# Patient Record
Sex: FEMALE | URBAN - METROPOLITAN AREA
[De-identification: names, ages, dates, MRNs, and addresses within clinical notes are randomized per-mention and may not be internally consistent; named-entity substitution may affect disease eponyms.]

---

## 2018-11-20 ENCOUNTER — APPOINTMENT (RX ONLY)
Dept: URBAN - METROPOLITAN AREA CLINIC 59 | Facility: CLINIC | Age: 63
Setting detail: DERMATOLOGY
End: 2018-11-20

## 2018-11-20 DIAGNOSIS — Z41.9 ENCOUNTER FOR PROCEDURE FOR PURPOSES OTHER THAN REMEDYING HEALTH STATE, UNSPECIFIED: ICD-10-CM

## 2018-11-20 PROCEDURE — ? LUMENIS M22

## 2018-11-20 NOTE — PROCEDURE: LUMENIS M22
External Cooling Fan Speed: 0
Skin Type (Optional): II
Detail Level: Detailed
Post-Care Instructions: I reviewed with the patient in detail post-care instructions. Patient should stay away from the sun and wear sun protection until treated areas are fully healed.
Fluence Units: J/cm2
Pulse Duration (In Milliseconds): 3
Total Pulses: 1
Procedure Text: Reviewed risks and benefits of treatment and consent form signed. \\nThe patient's skin was cleaned and decreased with witch hazel. \\nGel applied in thick layer to treatment area. \\n590, 19j, double pulse x 1 pass. \\n560, 18j, double pulse x 1 pass.\\n515, 15j, single pulse on brown spots only\\n515, 17-19j, double to single pulse to vessels at nares\\nPatient tolerated treatment well. Excellent clinical response noted. Clinicalm and SPF applied. Post instructions reviewed with patient. \\nFollow up \\n#1/3
Consent: Written consent obtained, risks reviewed including but not limited to crusting, scabbing, blistering, scarring, darker or lighter pigmentary change, bruising, and/or incomplete response.
Treated Area: large area

## 2018-12-19 ENCOUNTER — APPOINTMENT (RX ONLY)
Dept: URBAN - METROPOLITAN AREA CLINIC 59 | Facility: CLINIC | Age: 63
Setting detail: DERMATOLOGY
End: 2018-12-19

## 2018-12-19 DIAGNOSIS — Z41.9 ENCOUNTER FOR PROCEDURE FOR PURPOSES OTHER THAN REMEDYING HEALTH STATE, UNSPECIFIED: ICD-10-CM

## 2018-12-19 PROCEDURE — ? JUVEDERM VOLUMA XC INJECTION

## 2018-12-19 PROCEDURE — ? BOTOX

## 2018-12-19 PROCEDURE — ? ADDITIONAL NOTES

## 2018-12-19 PROCEDURE — ? LASER COSMETIC

## 2018-12-19 ASSESSMENT — LOCATION DETAILED DESCRIPTION DERM
LOCATION DETAILED: RIGHT CENTRAL EYEBROW
LOCATION DETAILED: RIGHT MEDIAL EYEBROW
LOCATION DETAILED: GLABELLA
LOCATION DETAILED: LEFT MEDIAL EYEBROW
LOCATION DETAILED: LEFT LATERAL EYEBROW
LOCATION DETAILED: RIGHT LATERAL EYEBROW

## 2018-12-19 ASSESSMENT — LOCATION SIMPLE DESCRIPTION DERM
LOCATION SIMPLE: LEFT EYEBROW
LOCATION SIMPLE: GLABELLA
LOCATION SIMPLE: RIGHT EYEBROW

## 2018-12-19 ASSESSMENT — LOCATION ZONE DERM: LOCATION ZONE: FACE

## 2018-12-19 NOTE — PROCEDURE: LASER COSMETIC
End-Point And Post-Procedure Care: Reviewed the risks and benefits of treatment and consent form signed. \\nSkin cleansed and gel applied. \\n515, 17-24j, double to single pulse small crystal. \\nExcellent clinical response. Patient tolerated well. Calming balm & SPF applied. Follow up 4 weeks
Render Post-Care In The Note: No
Laser Type: IPL
Detail Level: Zone
Anesthesia Volume In Cc: 0
Consent: Prior to the procedure, risks and benefits reviewed including but not limited to crusting, scabbing, blistering, scarring, darker or lighter pigmentary change, incidental hair removal, bruising, and/or incomplete removal.
Pre-Procedure Care: Prior to the procedure the patient and all present had protective eyewear in place and a warning sign was placed on the door.
Eye Protection: Laser Aid
Post-Care Instructions: I reviewed with the patient in detail post-care instructions. Patient should stay away from the sun and wear sun protection until treated areas are fully healed.

## 2018-12-19 NOTE — PROCEDURE: ADDITIONAL NOTES
Additional Notes: Voluma 2 syringes\\n27g1/2” needle aliquot temples \\nRight.5\\nLeft.5\\nZygomatic arch \\nV1\\nRight.1\\nLeft.1\\nV2\\nRight.2\\nLeft.2\\nApex\\nRight.2\\nLeft.2
Detail Level: Simple

## 2018-12-19 NOTE — PROCEDURE: JUVEDERM VOLUMA XC INJECTION
Anesthesia Type: 1% lidocaine with epinephrine
Vermilion Lips Filler Volume In Cc: 0
Expiration Date (Month Year): 2020/02/03
Additional Area 1 Location: right side
Filler: Juvederm Voluma XC
Additional Area 2 Location: left side
Map Statment: See Attach Map for Complete Details
Topical Anesthesia?: BLT cream (benzocaine 20%, lidocaine 6%, tetracaine 4%)
Anesthesia Volume In Cc: 0.5
Procedural Text: The filler was administered to the treatment areas noted above.\\n.3 cc to the temple\\n.2 cc per anterior cheek V1
Consent: Written consent obtained. Risks include but not limited to bruising, beading, irregular texture, ulceration, infection, allergic reaction, scar formation, incomplete augmentation, temporary nature, procedural pain.
Lot #: [id number]
Post-Care Instructions: Patient instructed to apply ice to reduce swelling.
Include Cannula Information In Note?: No
Use Map Statement For Sites (Optional): Yes
Detail Level: Detailed

## 2018-12-19 NOTE — PROCEDURE: BOTOX
Levator Labii Superioris Units: 0
Consent: Written consent obtained. Risks include but not limited to lid/brow ptosis, bruising, swelling, diplopia, temporary effect, incomplete chemical denervation.
Additional Area 3 Location: jelly roll
Lot #: [id number]
Post-Care Instructions: Patient instructed to not lie down for 4 hours and limit physical activity for 24 hours. Patient instructed not to travel by airplane for 48 hours.
Additional Area 1 Location: [facility]
Additional Area 2 Location: tail brows bilateral
Forehead Units: 6
Additional Area 5 Location: [facility]
Additional Area 6 Location: lip
Dilution (U/0.1 Cc): 2
Expiration Date (Month Year): 06/2021
Additional Area 4 Location: right tail brow
Glabellar Complex Units: 22
Detail Level: Zone

## 2019-01-07 ENCOUNTER — APPOINTMENT (RX ONLY)
Dept: URBAN - METROPOLITAN AREA CLINIC 59 | Facility: CLINIC | Age: 64
Setting detail: DERMATOLOGY
End: 2019-01-07

## 2019-01-07 DIAGNOSIS — Z41.9 ENCOUNTER FOR PROCEDURE FOR PURPOSES OTHER THAN REMEDYING HEALTH STATE, UNSPECIFIED: ICD-10-CM

## 2019-01-07 PROCEDURE — ? COSMETIC FOLLOW-UP

## 2019-01-07 NOTE — PROCEDURE: COSMETIC FOLLOW-UP
Comments (Free Text): Pt wasn’t sure she noticed any difference but after we did side by side pic she noticed a big difference and is happy with her filler
Detail Level: Zone

## 2019-01-15 ENCOUNTER — APPOINTMENT (RX ONLY)
Dept: URBAN - METROPOLITAN AREA CLINIC 59 | Facility: CLINIC | Age: 64
Setting detail: DERMATOLOGY
End: 2019-01-15

## 2019-01-15 DIAGNOSIS — Z41.9 ENCOUNTER FOR PROCEDURE FOR PURPOSES OTHER THAN REMEDYING HEALTH STATE, UNSPECIFIED: ICD-10-CM

## 2019-01-15 PROCEDURE — ? LUMENIS M22

## 2019-01-15 NOTE — PROCEDURE: LUMENIS M22
External Cooling Fan Speed: 0
Treatment Number: 3
Treated Area: large area
Consent: Written consent obtained, risks reviewed including but not limited to crusting, scabbing, blistering, scarring, darker or lighter pigmentary change, bruising, and/or incomplete response.
Fluence Units: J/cm2
Detail Level: Detailed
Total Pulses: 1
Procedure Text: Reviewed risks and benefits of treatment and consent form signed. \\nThe patient's skin was cleaned and decreased with witch hazel. \\nGel applied in thick layer to treatment area. \\n590, 19j, double pulse x 1 pass. \\n560, 18j, double pulse x 1 pass.\\n515, 15j, single pulse on brown spots only\\n515, 17-19j, double to single pulse to vessels at nares\\nPatient tolerated treatment well. Excellent clinical response noted. Clinicalm and SPF applied. Post instructions reviewed with patient. \\nFollow up \\n#1/3
Skin Type (Optional): II
Post-Care Instructions: I reviewed with the patient in detail post-care instructions. Patient should stay away from the sun and wear sun protection until treated areas are fully healed.

## 2019-10-22 ENCOUNTER — APPOINTMENT (RX ONLY)
Dept: URBAN - METROPOLITAN AREA CLINIC 59 | Facility: CLINIC | Age: 64
Setting detail: DERMATOLOGY
End: 2019-10-22

## 2019-10-22 DIAGNOSIS — Z41.9 ENCOUNTER FOR PROCEDURE FOR PURPOSES OTHER THAN REMEDYING HEALTH STATE, UNSPECIFIED: ICD-10-CM

## 2019-10-22 PROCEDURE — ? LUMENIS M22

## 2019-10-22 NOTE — PROCEDURE: LUMENIS M22
Pulse Duration (In Milliseconds): 3
Detail Level: Detailed
Pulse Delay (In Milliseconds): 0
Number Of Passes: 1
Procedure Text: Reviewed risks and benefits of treatment and consent form signed. \\nThe patient's skin was cleaned and decreased with witch hazel. \\nGel applied in thick layer to treatment area. \\n590, 19j, double pulse x 1 pass. \\n560, 18j, double pulse x 1 pass.\\n515, 15j, single pulse on brown spots only\\n515, 17-19j, double to single pulse to vessels at nares\\nPatient tolerated treatment well. Excellent clinical response noted. Clinicalm and SPF applied. Post instructions reviewed with patient. \\nFollow up \\n#1/3
Treated Area: large area
Fluence Units: J/cm2
Post-Care Instructions: I reviewed with the patient in detail post-care instructions. Patient should stay away from the sun and wear sun protection until treated areas are fully healed.
Consent: Written consent obtained, risks reviewed including but not limited to crusting, scabbing, blistering, scarring, darker or lighter pigmentary change, bruising, and/or incomplete response.
Skin Type (Optional): II

## 2019-11-19 ENCOUNTER — APPOINTMENT (RX ONLY)
Dept: URBAN - METROPOLITAN AREA CLINIC 59 | Facility: CLINIC | Age: 64
Setting detail: DERMATOLOGY
End: 2019-11-19

## 2019-11-19 DIAGNOSIS — Z41.9 ENCOUNTER FOR PROCEDURE FOR PURPOSES OTHER THAN REMEDYING HEALTH STATE, UNSPECIFIED: ICD-10-CM

## 2019-11-19 PROCEDURE — ? LUMENIS M22

## 2019-11-19 NOTE — PROCEDURE: LUMENIS M22
Detail Level: Detailed
Procedure Text: Reviewed risks and benefits of treatment and consent form signed. \\nThe patient's skin was cleaned and decreased with witch hazel. \\nGel applied in thick layer to treatment area. \\n590, 19j, double pulse x 1 pass. \\n560, 18j, double pulse x 1 pass.\\n515, 15j, single pulse on brown spots only\\n515, 17-19j, double to single pulse to vessels at nares\\nPatient tolerated treatment well. Excellent clinical response noted. Clinicalm and SPF applied. Post instructions reviewed with patient. \\nFollow up \\n#1/3
Pulse Delay (In Milliseconds): 0
Post-Care Instructions: I reviewed with the patient in detail post-care instructions. Patient should stay away from the sun and wear sun protection until treated areas are fully healed.
Consent: Written consent obtained, risks reviewed including but not limited to crusting, scabbing, blistering, scarring, darker or lighter pigmentary change, bruising, and/or incomplete response.
Pulse Duration (In Milliseconds): 3
Fluence Units: J/cm2
Treatment Number: 2
Total Pulses: 1
Treated Area: large area
Skin Type (Optional): II

## 2019-12-17 ENCOUNTER — APPOINTMENT (RX ONLY)
Dept: URBAN - METROPOLITAN AREA CLINIC 59 | Facility: CLINIC | Age: 64
Setting detail: DERMATOLOGY
End: 2019-12-17

## 2019-12-17 DIAGNOSIS — Z41.9 ENCOUNTER FOR PROCEDURE FOR PURPOSES OTHER THAN REMEDYING HEALTH STATE, UNSPECIFIED: ICD-10-CM

## 2019-12-17 PROCEDURE — ? LUMENIS M22

## 2019-12-17 NOTE — PROCEDURE: LUMENIS M22
Procedure Text: Reviewed risks and benefits of treatment and consent form signed. \\nThe patient's skin was cleaned and decreased with witch hazel. \\nGel applied in thick layer to treatment area. \\n590, 19j, double pulse x 1 pass. \\n560, 18j, double pulse x 1 pass.\\n515, 15j, single pulse on brown spots only\\n515, 17-19j, double to single pulse to vessels at nares\\nPatient tolerated treatment well. Excellent clinical response noted. Clinicalm and SPF applied. Post instructions reviewed with patient. \\nFollow up \\n#1/3
External Cooling Fan Speed: 0
Number Of Passes: 1
Fluence Units: J/cm2
Treatment Number: 3
Consent: Written consent obtained, risks reviewed including but not limited to crusting, scabbing, blistering, scarring, darker or lighter pigmentary change, bruising, and/or incomplete response.
Post-Care Instructions: I reviewed with the patient in detail post-care instructions. Patient should stay away from the sun and wear sun protection until treated areas are fully healed.
Skin Type (Optional): II
Detail Level: Detailed
Treated Area: large area

## 2020-01-15 ENCOUNTER — APPOINTMENT (RX ONLY)
Dept: URBAN - METROPOLITAN AREA CLINIC 59 | Facility: CLINIC | Age: 65
Setting detail: DERMATOLOGY
End: 2020-01-15

## 2020-01-15 DIAGNOSIS — Z41.9 ENCOUNTER FOR PROCEDURE FOR PURPOSES OTHER THAN REMEDYING HEALTH STATE, UNSPECIFIED: ICD-10-CM

## 2020-01-15 PROCEDURE — ? LUMENIS M22

## 2020-01-15 NOTE — PROCEDURE: LUMENIS M22
Post-Care Instructions: I reviewed with the patient in detail post-care instructions. Patient should stay away from the sun and wear sun protection until treated areas are fully healed.
External Cooling Fan Speed: 0
Number Of Passes: 1
Pulse Duration (In Milliseconds): 3
Treatment Number: 4
Skin Type (Optional): II
Consent: Written consent obtained, risks reviewed including but not limited to crusting, scabbing, blistering, scarring, darker or lighter pigmentary change, bruising, and/or incomplete response.
Treated Area: large area
Procedure Text: Reviewed risks and benefits of treatment and consent form signed. \\nThe patient's skin was cleaned and decreased with witch hazel. \\nGel applied in thick layer to treatment area. \\n590, 19j, double pulse x 1 pass. \\n560, 18j, double pulse x 1 pass.\\n515, 15j, single pulse on brown spots only\\n515, 17-19j, double to single pulse to vessels at nares\\nPatient tolerated treatment well. Excellent clinical response noted. Clinicalm and SPF applied. Post instructions reviewed with patient. \\nFollow up \\n#1/3
Fluence Units: J/cm2
Detail Level: Detailed

## 2020-02-12 ENCOUNTER — APPOINTMENT (RX ONLY)
Dept: URBAN - METROPOLITAN AREA CLINIC 59 | Facility: CLINIC | Age: 65
Setting detail: DERMATOLOGY
End: 2020-02-12

## 2020-02-12 DIAGNOSIS — Z41.9 ENCOUNTER FOR PROCEDURE FOR PURPOSES OTHER THAN REMEDYING HEALTH STATE, UNSPECIFIED: ICD-10-CM

## 2020-02-12 PROCEDURE — ? LUMENIS M22

## 2020-02-12 NOTE — PROCEDURE: LUMENIS M22
Fluence Units: J/cm2
Total Pulses: 1
Treated Area: large area
Consent: Written consent obtained, risks reviewed including but not limited to crusting, scabbing, blistering, scarring, darker or lighter pigmentary change, bruising, and/or incomplete response.
Pulse Duration (In Milliseconds): 3
Skin Type (Optional): II
Treatment Number: 5
Detail Level: Detailed
Pulse Delay (In Milliseconds): 0
Procedure Text: Reviewed risks and benefits of treatment and consent form signed. \\nThe patient's skin was cleaned and decreased with witch hazel. \\nGel applied in thick layer to treatment area. \\n590, 19j, double pulse x 1 pass. \\n560, 18j, double pulse x 1 pass.\\n515, 15j, single pulse on brown spots only\\n515, 17-19j, double to single pulse to vessels at nares\\nPatient tolerated treatment well. Excellent clinical response noted. Clinicalm and SPF applied. Post instructions reviewed with patient. \\nFollow up \\n#1/3
Post-Care Instructions: I reviewed with the patient in detail post-care instructions. Patient should stay away from the sun and wear sun protection until treated areas are fully healed.

## 2022-10-26 ENCOUNTER — APPOINTMENT (RX ONLY)
Dept: URBAN - METROPOLITAN AREA CLINIC 59 | Facility: CLINIC | Age: 67
Setting detail: DERMATOLOGY
End: 2022-10-26

## 2022-10-26 DIAGNOSIS — Z41.9 ENCOUNTER FOR PROCEDURE FOR PURPOSES OTHER THAN REMEDYING HEALTH STATE, UNSPECIFIED: ICD-10-CM

## 2022-10-26 PROCEDURE — ? BOTOX

## 2022-10-26 ASSESSMENT — LOCATION DETAILED DESCRIPTION DERM
LOCATION DETAILED: LEFT FOREHEAD
LOCATION DETAILED: RIGHT LATERAL FOREHEAD
LOCATION DETAILED: LEFT LATERAL FOREHEAD
LOCATION DETAILED: LEFT SUPERIOR FOREHEAD
LOCATION DETAILED: LEFT SUPERIOR CENTRAL MALAR CHEEK
LOCATION DETAILED: RIGHT LATERAL EYEBROW
LOCATION DETAILED: RIGHT SUPERIOR FOREHEAD
LOCATION DETAILED: LEFT INFERIOR TEMPLE
LOCATION DETAILED: SUPERIOR MID FOREHEAD
LOCATION DETAILED: GLABELLA
LOCATION DETAILED: RIGHT CENTRAL EYEBROW
LOCATION DETAILED: LEFT LATERAL EYEBROW
LOCATION DETAILED: RIGHT SUPERIOR CENTRAL MALAR CHEEK
LOCATION DETAILED: RIGHT MEDIAL EYEBROW
LOCATION DETAILED: RIGHT INFERIOR TEMPLE
LOCATION DETAILED: LEFT CENTRAL EYEBROW
LOCATION DETAILED: LEFT MID TEMPLE
LOCATION DETAILED: LEFT MEDIAL EYEBROW
LOCATION DETAILED: RIGHT FOREHEAD

## 2022-10-26 ASSESSMENT — LOCATION SIMPLE DESCRIPTION DERM
LOCATION SIMPLE: LEFT TEMPLE
LOCATION SIMPLE: RIGHT TEMPLE
LOCATION SIMPLE: GLABELLA
LOCATION SIMPLE: LEFT FOREHEAD
LOCATION SIMPLE: RIGHT FOREHEAD
LOCATION SIMPLE: RIGHT CHEEK
LOCATION SIMPLE: RIGHT EYEBROW
LOCATION SIMPLE: LEFT EYEBROW
LOCATION SIMPLE: LEFT CHEEK
LOCATION SIMPLE: SUPERIOR FOREHEAD

## 2022-10-26 ASSESSMENT — LOCATION ZONE DERM: LOCATION ZONE: FACE

## 2022-10-26 NOTE — PROCEDURE: BOTOX
Show Depressor Anguli Units: Yes
Depressor Anguli Oris Units: 0
Show Ucl Units: No
Consent: Written consent obtained. Risks include but not limited to lid/brow ptosis, bruising, swelling, diplopia, temporary effect, incomplete chemical denervation.
Additional Area 5 Location: [facility]
Additional Area 2 Location: sup brow arch B
Lot #: [id number]
Post-Care Instructions: Patient instructed to not lie down for 4 hours and limit physical activity for 24 hours.
Periorbital Skin Units: 20
Additional Area 3 Location: lat brow B
Dilution (U/0.1 Cc): 4
Expiration Date (Month Year): 11 2023
Additional Area 4 Location: [facility]
Forehead Units: 6
Additional Area 1 Location: obicularis oris
Glabellar Complex Units: 22
Additional Area 6 Location: under eye
Detail Level: Detailed

## 2022-11-09 ENCOUNTER — APPOINTMENT (RX ONLY)
Dept: URBAN - METROPOLITAN AREA CLINIC 59 | Facility: CLINIC | Age: 67
Setting detail: DERMATOLOGY
End: 2022-11-09

## 2022-11-09 DIAGNOSIS — Z41.9 ENCOUNTER FOR PROCEDURE FOR PURPOSES OTHER THAN REMEDYING HEALTH STATE, UNSPECIFIED: ICD-10-CM

## 2022-11-09 PROCEDURE — ? COSMETIC FOLLOW-UP

## 2022-11-09 ASSESSMENT — LOCATION SIMPLE DESCRIPTION DERM
LOCATION SIMPLE: LEFT TEMPLE
LOCATION SIMPLE: LEFT CHEEK
LOCATION SIMPLE: LEFT EYELID
LOCATION SIMPLE: SUPERIOR FOREHEAD
LOCATION SIMPLE: RIGHT TEMPLE
LOCATION SIMPLE: LEFT FOREHEAD
LOCATION SIMPLE: LEFT EYEBROW
LOCATION SIMPLE: RIGHT FOREHEAD
LOCATION SIMPLE: RIGHT EYEBROW
LOCATION SIMPLE: GLABELLA
LOCATION SIMPLE: RIGHT CHEEK

## 2022-11-09 ASSESSMENT — LOCATION DETAILED DESCRIPTION DERM
LOCATION DETAILED: LEFT SUPERIOR CENTRAL MALAR CHEEK
LOCATION DETAILED: RIGHT FOREHEAD
LOCATION DETAILED: RIGHT LATERAL EYEBROW
LOCATION DETAILED: RIGHT LATERAL FOREHEAD
LOCATION DETAILED: LEFT INFERIOR MEDIAL FOREHEAD
LOCATION DETAILED: LEFT LATERAL CANTHUS
LOCATION DETAILED: RIGHT INFERIOR TEMPLE
LOCATION DETAILED: LEFT INFERIOR TEMPLE
LOCATION DETAILED: RIGHT CENTRAL EYEBROW
LOCATION DETAILED: RIGHT SUPERIOR CENTRAL MALAR CHEEK
LOCATION DETAILED: LEFT LATERAL FOREHEAD
LOCATION DETAILED: GLABELLA
LOCATION DETAILED: SUPERIOR MID FOREHEAD
LOCATION DETAILED: LEFT FOREHEAD
LOCATION DETAILED: LEFT LATERAL EYEBROW

## 2022-11-09 ASSESSMENT — LOCATION ZONE DERM
LOCATION ZONE: EYELID
LOCATION ZONE: FACE